# Patient Record
Sex: FEMALE | Race: BLACK OR AFRICAN AMERICAN | NOT HISPANIC OR LATINO | Employment: OTHER | URBAN - METROPOLITAN AREA
[De-identification: names, ages, dates, MRNs, and addresses within clinical notes are randomized per-mention and may not be internally consistent; named-entity substitution may affect disease eponyms.]

---

## 2023-02-27 ENCOUNTER — OFFICE VISIT (OUTPATIENT)
Dept: PODIATRY | Facility: CLINIC | Age: 58
End: 2023-02-27
Payer: MEDICAID

## 2023-02-27 VITALS
HEART RATE: 67 BPM | BODY MASS INDEX: 35.78 KG/M2 | WEIGHT: 222.63 LBS | SYSTOLIC BLOOD PRESSURE: 111 MMHG | HEIGHT: 66 IN | DIASTOLIC BLOOD PRESSURE: 81 MMHG

## 2023-02-27 DIAGNOSIS — M79.672 LEFT FOOT PAIN: ICD-10-CM

## 2023-02-27 DIAGNOSIS — M72.2 PLANTAR FASCIAL FIBROMATOSIS OF LEFT FOOT: Primary | ICD-10-CM

## 2023-02-27 DIAGNOSIS — R26.2 DIFFICULTY WALKING: ICD-10-CM

## 2023-02-27 PROCEDURE — 99203 PR OFFICE/OUTPT VISIT, NEW, LEVL III, 30-44 MIN: ICD-10-PCS | Mod: 25,S$GLB,, | Performed by: PODIATRIST

## 2023-02-27 PROCEDURE — 3079F DIAST BP 80-89 MM HG: CPT | Mod: CPTII,S$GLB,, | Performed by: PODIATRIST

## 2023-02-27 PROCEDURE — 3074F SYST BP LT 130 MM HG: CPT | Mod: CPTII,S$GLB,, | Performed by: PODIATRIST

## 2023-02-27 PROCEDURE — 20550 PR INJECT TENDON SHEATH/LIGAMENT: ICD-10-PCS | Mod: S$GLB,,, | Performed by: PODIATRIST

## 2023-02-27 PROCEDURE — 1160F PR REVIEW ALL MEDS BY PRESCRIBER/CLIN PHARMACIST DOCUMENTED: ICD-10-PCS | Mod: CPTII,S$GLB,, | Performed by: PODIATRIST

## 2023-02-27 PROCEDURE — 1160F RVW MEDS BY RX/DR IN RCRD: CPT | Mod: CPTII,S$GLB,, | Performed by: PODIATRIST

## 2023-02-27 PROCEDURE — 3008F PR BODY MASS INDEX (BMI) DOCUMENTED: ICD-10-PCS | Mod: CPTII,S$GLB,, | Performed by: PODIATRIST

## 2023-02-27 PROCEDURE — 3079F PR MOST RECENT DIASTOLIC BLOOD PRESSURE 80-89 MM HG: ICD-10-PCS | Mod: CPTII,S$GLB,, | Performed by: PODIATRIST

## 2023-02-27 PROCEDURE — 3074F PR MOST RECENT SYSTOLIC BLOOD PRESSURE < 130 MM HG: ICD-10-PCS | Mod: CPTII,S$GLB,, | Performed by: PODIATRIST

## 2023-02-27 PROCEDURE — 99203 OFFICE O/P NEW LOW 30 MIN: CPT | Mod: 25,S$GLB,, | Performed by: PODIATRIST

## 2023-02-27 PROCEDURE — 3008F BODY MASS INDEX DOCD: CPT | Mod: CPTII,S$GLB,, | Performed by: PODIATRIST

## 2023-02-27 PROCEDURE — 20550 NJX 1 TENDON SHEATH/LIGAMENT: CPT | Mod: S$GLB,,, | Performed by: PODIATRIST

## 2023-02-27 PROCEDURE — 1159F PR MEDICATION LIST DOCUMENTED IN MEDICAL RECORD: ICD-10-PCS | Mod: CPTII,S$GLB,, | Performed by: PODIATRIST

## 2023-02-27 PROCEDURE — 1159F MED LIST DOCD IN RCRD: CPT | Mod: CPTII,S$GLB,, | Performed by: PODIATRIST

## 2023-02-27 RX ORDER — ATORVASTATIN CALCIUM 20 MG/1
20 TABLET, FILM COATED ORAL
COMMUNITY
Start: 2023-02-14

## 2023-02-27 RX ORDER — AMLODIPINE BESYLATE 10 MG/1
10 TABLET ORAL
COMMUNITY
Start: 2023-02-14

## 2023-02-27 RX ORDER — SERTRALINE HYDROCHLORIDE 100 MG/1
100 TABLET, FILM COATED ORAL 2 TIMES DAILY
COMMUNITY
Start: 2023-02-14

## 2023-02-27 RX ORDER — DEXAMETHASONE SODIUM PHOSPHATE 4 MG/ML
4 INJECTION, SOLUTION INTRA-ARTICULAR; INTRALESIONAL; INTRAMUSCULAR; INTRAVENOUS; SOFT TISSUE
Status: DISCONTINUED | OUTPATIENT
Start: 2023-02-27 | End: 2023-02-27 | Stop reason: HOSPADM

## 2023-02-27 RX ORDER — CARIPRAZINE 3 MG/1
3 CAPSULE, GELATIN COATED ORAL
COMMUNITY
Start: 2023-02-14

## 2023-02-27 RX ORDER — LIDOCAINE HYDROCHLORIDE 10 MG/ML
1 INJECTION INFILTRATION; PERINEURAL
Status: DISCONTINUED | OUTPATIENT
Start: 2023-02-27 | End: 2023-02-27 | Stop reason: HOSPADM

## 2023-02-27 RX ORDER — OMEPRAZOLE 40 MG/1
40 CAPSULE, DELAYED RELEASE ORAL EVERY MORNING
COMMUNITY
Start: 2022-11-16

## 2023-02-27 RX ADMIN — DEXAMETHASONE SODIUM PHOSPHATE 4 MG: 4 INJECTION, SOLUTION INTRA-ARTICULAR; INTRALESIONAL; INTRAMUSCULAR; INTRAVENOUS; SOFT TISSUE at 09:02

## 2023-02-27 RX ADMIN — LIDOCAINE HYDROCHLORIDE 1 ML: 10 INJECTION INFILTRATION; PERINEURAL at 09:02

## 2023-02-27 NOTE — PROGRESS NOTES
Subjective:      Patient ID: Alyssa Nicholas is a 57 y.o. female.    Chief Complaint: Foot Pain    Alyssa is a 57 y.o. female who presents to the podiatry clinic  with complaint of  left foot pain. Onset of the symptoms was several weeks ago. Precipitating event:  growth of soft tissue lesion on the bottom of the left foot . Current symptoms include: ability to bear weight, but with some pain, redness, swelling, and worsening symptoms after a period of activity. Aggravating factors: standing, walking, and direct contact with lesion . Symptoms have gradually worsened. Patient has had prior foot problems. Treatment to date: corticosteroid injection which was effective and rest. Patients rates pain 5/10 on pain scale.    Review of Systems   Constitutional: Negative for chills and fever.   Cardiovascular:  Negative for chest pain and leg swelling.   Respiratory:  Negative for cough and shortness of breath.    Gastrointestinal:  Negative for diarrhea, nausea and vomiting.         Objective:      Physical Exam  Vitals reviewed.   Constitutional:       General: She is not in acute distress.     Appearance: Normal appearance. She is not ill-appearing.   HENT:      Head: Normocephalic.      Nose: Nose normal.   Cardiovascular:      Rate and Rhythm: Normal rate.   Pulmonary:      Effort: Pulmonary effort is normal. No respiratory distress.   Skin:     Capillary Refill: Capillary refill takes 2 to 3 seconds.   Neurological:      Mental Status: She is alert and oriented to person, place, and time.   Psychiatric:         Mood and Affect: Mood normal.         Behavior: Behavior normal.         Thought Content: Thought content normal.         Judgment: Judgment normal.     Musculoskeletal:  5/5 muscle strength noted bilateral foot, ankle joint range of motion is full without pain, pain and tenderness with palpation of swollen soft tissue firm mass on the plantar aspect left foot within the medial band of the plantar fascia at  the medial arch  Vascular:  DP and PT pulses palpable 2/4 bilateral foot, capillary fill time less than 3 seconds to digits, skin temperature gradient within normal limits proximal distal bilateral foot, no edema noted bilateral foot   Neurologic:  Protective and light touch sensation intact bilateral lower extremity   Dermatologic:  No open lesions noted bilateral foot, no rashes noted bilateral foot, no interdigital maceration noted bilateral foot, soft tissue firm non moveable lesion noted within the plantar medial arch left foot (size of a silver dollar in diameter), left 5th digit nail plate is excessively thickened darkened and elongated          Assessment:       Encounter Diagnoses   Name Primary?    Plantar fascial fibromatosis of left foot Yes    Left foot pain     Difficulty walking          Plan:       Alyssa was seen today for foot pain.    Diagnoses and all orders for this visit:    Plantar fascial fibromatosis of left foot    Left foot pain    Difficulty walking      I counseled the patient on her conditions, their implications and medical management.        1. Patient was examined and evaluated.    2. Discussed with patient etiology of plantar fibroma.  Discussed treatment options including local corticosteroid injection for reduction of pain, oral Medrol Dosepak for reduction of symptoms, and surgical intervention for excision of the mass.  Patient was dispensed offloading aperture pads for reduction of direct contact to the area.    Tendon Sheath    Date/Time: 2/27/2023 9:00 AM  Performed by: Clifford Ken DPM  Authorized by: Clifford Ken DPM     Consent Done?:  Yes (Verbal)  Indications:  Pain  Location:  Foot  Foot joint: left medial band of plantar fascia/ site of plantar fibroma.  Ultrasonic guidance for needle placement?: No    Needle size:  25 G  Approach:  Medial  Medications:  1 mL LIDOcaine HCL 10 mg/ml (1%) 10 mg/mL (1 %); 4 mg dexAMETHasone 4 mg/mL  Patient tolerance:   Patient tolerated the procedure well with no immediate complications    3. Patient was advised to continue with comfortable shoe gear both inside and outside the home.  Patient was advised to discontinue any barefoot walking and to limit the use of flip-flops and slides.  4. Patient was advised to adjunct with OTC analgesics for pain relief  5. Patient will follow-up in 1 week or p.r.n. for complaints

## 2023-03-06 ENCOUNTER — OFFICE VISIT (OUTPATIENT)
Dept: PODIATRY | Facility: CLINIC | Age: 58
End: 2023-03-06
Payer: MEDICAID

## 2023-03-06 VITALS
SYSTOLIC BLOOD PRESSURE: 131 MMHG | HEART RATE: 57 BPM | DIASTOLIC BLOOD PRESSURE: 93 MMHG | BODY MASS INDEX: 35.68 KG/M2 | WEIGHT: 222 LBS | HEIGHT: 66 IN

## 2023-03-06 DIAGNOSIS — M72.2 PLANTAR FASCIITIS: Primary | ICD-10-CM

## 2023-03-06 DIAGNOSIS — R26.2 DIFFICULTY WALKING: ICD-10-CM

## 2023-03-06 DIAGNOSIS — M72.2 PLANTAR FASCIAL FIBROMATOSIS OF LEFT FOOT: ICD-10-CM

## 2023-03-06 DIAGNOSIS — M79.672 LEFT FOOT PAIN: ICD-10-CM

## 2023-03-06 PROCEDURE — 3008F BODY MASS INDEX DOCD: CPT | Mod: CPTII,S$GLB,, | Performed by: PODIATRIST

## 2023-03-06 PROCEDURE — 99212 OFFICE O/P EST SF 10 MIN: CPT | Mod: S$GLB,,, | Performed by: PODIATRIST

## 2023-03-06 PROCEDURE — 3080F DIAST BP >= 90 MM HG: CPT | Mod: CPTII,S$GLB,, | Performed by: PODIATRIST

## 2023-03-06 PROCEDURE — 99212 PR OFFICE/OUTPT VISIT, EST, LEVL II, 10-19 MIN: ICD-10-PCS | Mod: S$GLB,,, | Performed by: PODIATRIST

## 2023-03-06 PROCEDURE — 3080F PR MOST RECENT DIASTOLIC BLOOD PRESSURE >= 90 MM HG: ICD-10-PCS | Mod: CPTII,S$GLB,, | Performed by: PODIATRIST

## 2023-03-06 PROCEDURE — 3075F PR MOST RECENT SYSTOLIC BLOOD PRESS GE 130-139MM HG: ICD-10-PCS | Mod: CPTII,S$GLB,, | Performed by: PODIATRIST

## 2023-03-06 PROCEDURE — 1159F PR MEDICATION LIST DOCUMENTED IN MEDICAL RECORD: ICD-10-PCS | Mod: CPTII,S$GLB,, | Performed by: PODIATRIST

## 2023-03-06 PROCEDURE — 3075F SYST BP GE 130 - 139MM HG: CPT | Mod: CPTII,S$GLB,, | Performed by: PODIATRIST

## 2023-03-06 PROCEDURE — 3008F PR BODY MASS INDEX (BMI) DOCUMENTED: ICD-10-PCS | Mod: CPTII,S$GLB,, | Performed by: PODIATRIST

## 2023-03-06 PROCEDURE — 1159F MED LIST DOCD IN RCRD: CPT | Mod: CPTII,S$GLB,, | Performed by: PODIATRIST

## 2023-03-13 NOTE — PROGRESS NOTES
Subjective:      Patient ID: Alyssa Nicholas is a 58 y.o. female.    Chief Complaint: Foot Pain    Alyssa is a 58 y.o. female who presents to the podiatry clinic  with complaint of  left foot pain. Onset of the symptoms was several weeks ago. Precipitating event:  growth of soft tissue lesion on the bottom of the left foot . Current symptoms include: ability to bear weight, but with some pain, redness, swelling, and worsening symptoms after a period of activity. Aggravating factors: standing, walking, and direct contact with lesion . Symptoms have gradually worsened. Patient has had prior foot problems. Treatment to date: corticosteroid injection which was effective and rest. Patients rates pain 0/10 on pain scale.    Review of Systems   Constitutional: Negative for chills and fever.   Cardiovascular:  Negative for chest pain and leg swelling.   Respiratory:  Negative for cough and shortness of breath.    Gastrointestinal:  Negative for diarrhea, nausea and vomiting.         Objective:      Physical Exam  Vitals reviewed.   Constitutional:       General: She is not in acute distress.     Appearance: Normal appearance. She is not ill-appearing.   HENT:      Head: Normocephalic.      Nose: Nose normal.   Cardiovascular:      Rate and Rhythm: Bradycardia present.   Pulmonary:      Effort: Pulmonary effort is normal. No respiratory distress.   Skin:     Capillary Refill: Capillary refill takes 2 to 3 seconds.   Neurological:      Mental Status: She is alert and oriented to person, place, and time.   Psychiatric:         Mood and Affect: Mood normal.         Behavior: Behavior normal.         Thought Content: Thought content normal.         Judgment: Judgment normal.     Musculoskeletal:  5/5 muscle strength noted bilateral foot, ankle joint range of motion is full without pain, improved pain and tenderness with palpation of swollen soft tissue firm mass on the plantar aspect left foot within the medial band of the  plantar fascia at the medial arch, mild tenderness with palpation of left calcaneal medial tubercle  Vascular:  DP and PT pulses palpable 2/4 bilateral foot, capillary fill time less than 3 seconds to digits, skin temperature gradient within normal limits proximal distal bilateral foot, no edema noted bilateral foot   Neurologic:  Protective and light touch sensation intact bilateral lower extremity   Dermatologic:  No open lesions noted bilateral foot, no rashes noted bilateral foot, no interdigital maceration noted bilateral foot, soft tissue firm non moveable lesion noted within the plantar medial arch left foot (size of a silver dollar in diameter), left 5th digit nail plate is excessively thickened darkened and elongated        Assessment:       Encounter Diagnoses   Name Primary?    Plantar fasciitis Yes    Plantar fascial fibromatosis of left foot     Left foot pain     Difficulty walking          Plan:       Alyssa was seen today for foot pain.    Diagnoses and all orders for this visit:    Plantar fasciitis    Plantar fascial fibromatosis of left foot    Left foot pain    Difficulty walking      I counseled the patient on her conditions, their implications and medical management.        1. Patient was examined and evaluated.    2. Discussed with patient etiology of plantar fibroma.  Discussed treatment options including local corticosteroid injection for reduction of pain, oral Medrol Dosepak for reduction of symptoms, and surgical intervention for excision of the mass.  Patient was dispensed offloading aperture pads for reduction of direct contact to the area.  Secondary to improve pain complaints from previous injection further injection will be avoided at this appointment.  Patient was advised of possible potential for recurrence of symptoms over time secondary to the nature of her employment.  Briefly discussed with patient surgical excision of fibroma.  3. Patient was advised to continue with  comfortable shoe gear both inside and outside the home.  Patient was advised to discontinue any barefoot walking and to limit the use of flip-flops and slides.  4. Patient was advised to continue to adjunct with OTC analgesics for pain relief  5. Patient will follow-up in 3 weeks or p.r.n. for complaints

## 2023-03-27 ENCOUNTER — OFFICE VISIT (OUTPATIENT)
Dept: PODIATRY | Facility: CLINIC | Age: 58
End: 2023-03-27
Payer: MEDICAID

## 2023-03-27 VITALS
DIASTOLIC BLOOD PRESSURE: 82 MMHG | SYSTOLIC BLOOD PRESSURE: 117 MMHG | WEIGHT: 222 LBS | HEIGHT: 66 IN | HEART RATE: 72 BPM | BODY MASS INDEX: 35.68 KG/M2

## 2023-03-27 DIAGNOSIS — M72.2 PLANTAR FASCIITIS: ICD-10-CM

## 2023-03-27 DIAGNOSIS — M72.2 PLANTAR FASCIAL FIBROMATOSIS OF LEFT FOOT: Primary | ICD-10-CM

## 2023-03-27 PROCEDURE — 3008F PR BODY MASS INDEX (BMI) DOCUMENTED: ICD-10-PCS | Mod: CPTII,S$GLB,, | Performed by: PODIATRIST

## 2023-03-27 PROCEDURE — 1159F MED LIST DOCD IN RCRD: CPT | Mod: CPTII,S$GLB,, | Performed by: PODIATRIST

## 2023-03-27 PROCEDURE — 99212 OFFICE O/P EST SF 10 MIN: CPT | Mod: S$GLB,,, | Performed by: PODIATRIST

## 2023-03-27 PROCEDURE — 3079F DIAST BP 80-89 MM HG: CPT | Mod: CPTII,S$GLB,, | Performed by: PODIATRIST

## 2023-03-27 PROCEDURE — 1159F PR MEDICATION LIST DOCUMENTED IN MEDICAL RECORD: ICD-10-PCS | Mod: CPTII,S$GLB,, | Performed by: PODIATRIST

## 2023-03-27 PROCEDURE — 1160F RVW MEDS BY RX/DR IN RCRD: CPT | Mod: CPTII,S$GLB,, | Performed by: PODIATRIST

## 2023-03-27 PROCEDURE — 3079F PR MOST RECENT DIASTOLIC BLOOD PRESSURE 80-89 MM HG: ICD-10-PCS | Mod: CPTII,S$GLB,, | Performed by: PODIATRIST

## 2023-03-27 PROCEDURE — 3074F SYST BP LT 130 MM HG: CPT | Mod: CPTII,S$GLB,, | Performed by: PODIATRIST

## 2023-03-27 PROCEDURE — 3074F PR MOST RECENT SYSTOLIC BLOOD PRESSURE < 130 MM HG: ICD-10-PCS | Mod: CPTII,S$GLB,, | Performed by: PODIATRIST

## 2023-03-27 PROCEDURE — 1160F PR REVIEW ALL MEDS BY PRESCRIBER/CLIN PHARMACIST DOCUMENTED: ICD-10-PCS | Mod: CPTII,S$GLB,, | Performed by: PODIATRIST

## 2023-03-27 PROCEDURE — 99212 PR OFFICE/OUTPT VISIT, EST, LEVL II, 10-19 MIN: ICD-10-PCS | Mod: S$GLB,,, | Performed by: PODIATRIST

## 2023-03-27 PROCEDURE — 3008F BODY MASS INDEX DOCD: CPT | Mod: CPTII,S$GLB,, | Performed by: PODIATRIST

## 2023-04-03 NOTE — PROGRESS NOTES
Subjective:     Patient ID: Alyssa Nicholas is a 58 y.o. female.    Chief Complaint: Heel Pain    Alyssa is a 58 y.o. female who presents to the podiatry clinic  with complaint of  left foot pain. Onset of the symptoms was several months ago. Precipitating event:  growth of soft tissue lesion on the bottom of the left foot . Current symptoms include: minimal current symptoms. Aggravating factors: standing, walking, and direct contact with lesion . Symptoms have gradually improved. Patient has had prior foot problems. Treatment to date: corticosteroid injection which was effective and rest. Patients rates pain 0/10 on pain scale.    Review of Systems   Constitutional: Negative for chills and fever.   Cardiovascular:  Negative for chest pain and leg swelling.   Respiratory:  Negative for cough and shortness of breath.    Gastrointestinal:  Negative for diarrhea, nausea and vomiting.      Objective:     Physical Exam  Vitals reviewed.   Constitutional:       General: She is not in acute distress.     Appearance: Normal appearance. She is not ill-appearing.   HENT:      Nose: Nose normal.   Cardiovascular:      Rate and Rhythm: Normal rate.   Pulmonary:      Effort: Pulmonary effort is normal. No respiratory distress.   Skin:     Capillary Refill: Capillary refill takes 2 to 3 seconds.   Neurological:      Mental Status: She is alert and oriented to person, place, and time.   Psychiatric:         Mood and Affect: Mood normal.         Behavior: Behavior normal.         Thought Content: Thought content normal.         Judgment: Judgment normal.     Musculoskeletal:  5/5 muscle strength noted bilateral foot, ankle joint range of motion is full without pain, NO pain and tenderness with palpation of swollen soft tissue firm mass on the plantar aspect left foot within the medial band of the plantar fascia at the medial arch, mild tenderness with palpation of left calcaneal medial tubercle  Vascular:  DP and PT pulses  palpable 2/4 bilateral foot, capillary fill time less than 3 seconds to digits, skin temperature gradient within normal limits proximal distal bilateral foot, no edema noted bilateral foot   Neurologic:  Protective and light touch sensation intact bilateral lower extremity   Dermatologic:  No open lesions noted bilateral foot, no rashes noted bilateral foot, no interdigital maceration noted bilateral foot, soft tissue firm non moveable lesion noted within the plantar medial arch left foot (size of a silver dollar in diameter), left 5th digit nail plate is excessively thickened darkened and elongated    Assessment:      Encounter Diagnoses   Name Primary?    Plantar fascial fibromatosis of left foot Yes    Plantar fasciitis      Plan:     Alyssa was seen today for heel pain.    Diagnoses and all orders for this visit:    Plantar fascial fibromatosis of left foot    Plantar fasciitis      I counseled the patient on her conditions, their implications and medical management.        1. Patient was examined and evaluated.    2. Discussed with patient etiology of plantar fibroma.  Discussed treatment options including local corticosteroid injection for reduction of pain, oral Medrol Dosepak for reduction of symptoms, and surgical intervention for excision of the mass.  Patient was dispensed offloading aperture pads for reduction of direct contact to the area.  Secondary to improve pain complaints from previous injection further injection will be avoided at this appointment.  Patient was advised of possible potential for recurrence of symptoms over time secondary to the nature of her employment.  Briefly discussed with patient surgical excision of fibroma.  3. Patient was advised to continue with comfortable shoe gear both inside and outside the home.  Patient was advised to discontinue any barefoot walking and to limit the use of flip-flops and slides.  4. Patient was advised to continue to adjunct with OTC analgesics  for pain relief  5. Patient will follow-up p.r.n. for complaints